# Patient Record
Sex: MALE | Race: WHITE | NOT HISPANIC OR LATINO | Employment: FULL TIME | URBAN - METROPOLITAN AREA
[De-identification: names, ages, dates, MRNs, and addresses within clinical notes are randomized per-mention and may not be internally consistent; named-entity substitution may affect disease eponyms.]

---

## 2021-06-03 ENCOUNTER — TELEPHONE (OUTPATIENT)
Dept: SCHEDULING | Facility: IMAGING CENTER | Age: 31
End: 2021-06-03

## 2021-06-08 ENCOUNTER — OFFICE VISIT (OUTPATIENT)
Dept: MEDICAL GROUP | Facility: PHYSICIAN GROUP | Age: 31
End: 2021-06-08
Payer: COMMERCIAL

## 2021-06-08 VITALS
WEIGHT: 297 LBS | OXYGEN SATURATION: 98 % | HEIGHT: 68 IN | TEMPERATURE: 98.2 F | SYSTOLIC BLOOD PRESSURE: 140 MMHG | BODY MASS INDEX: 45.01 KG/M2 | RESPIRATION RATE: 16 BRPM | HEART RATE: 96 BPM | DIASTOLIC BLOOD PRESSURE: 82 MMHG

## 2021-06-08 DIAGNOSIS — Z13.6 SCREENING FOR CARDIOVASCULAR CONDITION: ICD-10-CM

## 2021-06-08 DIAGNOSIS — F41.1 GAD (GENERALIZED ANXIETY DISORDER): ICD-10-CM

## 2021-06-08 DIAGNOSIS — G56.03 BILATERAL CARPAL TUNNEL SYNDROME: ICD-10-CM

## 2021-06-08 DIAGNOSIS — Z13.1 ENCOUNTER FOR SCREENING FOR DIABETES MELLITUS: ICD-10-CM

## 2021-06-08 DIAGNOSIS — Z13.0 SCREENING FOR DEFICIENCY ANEMIA: ICD-10-CM

## 2021-06-08 DIAGNOSIS — G47.33 OSA (OBSTRUCTIVE SLEEP APNEA): ICD-10-CM

## 2021-06-08 DIAGNOSIS — M25.50 ARTHRALGIA, UNSPECIFIED JOINT: ICD-10-CM

## 2021-06-08 DIAGNOSIS — F33.1 MODERATE EPISODE OF RECURRENT MAJOR DEPRESSIVE DISORDER (HCC): ICD-10-CM

## 2021-06-08 DIAGNOSIS — M72.2 PLANTAR FASCIITIS, BILATERAL: ICD-10-CM

## 2021-06-08 PROBLEM — F32.9 MDD (MAJOR DEPRESSIVE DISORDER): Status: ACTIVE | Noted: 2021-06-08

## 2021-06-08 PROCEDURE — 99204 OFFICE O/P NEW MOD 45 MIN: CPT | Performed by: INTERNAL MEDICINE

## 2021-06-08 RX ORDER — BUSPIRONE HYDROCHLORIDE 10 MG/1
10 TABLET ORAL 2 TIMES DAILY
Qty: 60 TABLET | Refills: 3 | Status: SHIPPED | OUTPATIENT
Start: 2021-06-08

## 2021-06-08 ASSESSMENT — PATIENT HEALTH QUESTIONNAIRE - PHQ9
CLINICAL INTERPRETATION OF PHQ2 SCORE: 2
5. POOR APPETITE OR OVEREATING: 3 - NEARLY EVERY DAY
SUM OF ALL RESPONSES TO PHQ QUESTIONS 1-9: 16

## 2021-06-08 NOTE — PROGRESS NOTES
Subjective:     CC: Establish care, annual well visit    HPI:   Rubén Tolbert is a 31 y.o. male who presents for an annual exam.     Health Maintenance  Cholesterol Screening:  Diabetes Screening:   Diet:   Exercise:   Substance Abuse:   Sunscreen used.    Infectious disease screening/Immunizations  --HIV Screening:   --Hepatitis C Screening:   --Immunizations:    Tetanus:      He  has no past medical history on file.  He  has no past surgical history on file.  No family history on file.  Social History     Tobacco Use   • Smoking status: Not on file   Substance Use Topics   • Alcohol use: Not on file   • Drug use: Not on file       There are no problems to display for this patient.      No current outpatient medications on file.     No current facility-administered medications for this visit.    (including changes today)  Allergies: Patient has no allergy information on record.    Review of Systems   Constitutional: Negative for fever, chills   Respiratory: Negative for cough and shortness of breath.    Cardiovascular: Negative for chest pain or palpitations  Gastrointestinal: Negative for nausea, vomiting, abdominal pain and diarrhea.   Psychiatric/Behavioral: Negative for depression.  The patient is not nervous/anxious.    Objective:     There were no vitals taken for this visit.  There is no height or weight on file to calculate BMI.  Wt Readings from Last 4 Encounters:   No data found for Wt       Physical Exam:  Constitutional: Well-developed and well-nourished.  Eyes: Pupils are equal, round, and reactive to light. No scleral icterus.   Mouth/Throat: Oropharynx is clear and moist. Posterior pharynx without erythema or exudates.  Neck: No thyromegaly present. No lymphadenopathy--cervical or supraclavicular.  Cardiovascular: Regular rate and rhythm, S1 and S2 without murmur, rubs, or gallops.  Lungs: Normal inspiratory effort, CTA bilaterally, no wheezes/rhonchi/rales  Abdomen: Soft, non tender, and without  distention.  Extremities: No cyanosis, clubbing, erythema, nor edema.   Psychiatric:  Behavior, mood, and affect are appropriate.    Assessment and Plan:   Rubén Tolbert is a 31 y.o. male who presents for an annual exam.       HCM: Completed  Labs per orders  Immunizations per orders  Patient counseled about diet, exercise, safe sex, and sun protection.    Follow-up: No follow-ups on file.     My total time spent caring for the patient on the day of the encounter was *** minutes.   This does not include time spent on separately billable procedures/tests.      Please note that this dictation was created using voice recognition software. I have made every reasonable attempt to correct obvious errors, but I expect that there are errors of grammar and possibly content that I did not discover before finalizing the note.

## 2021-06-08 NOTE — PROGRESS NOTES
Subjective:     CC: Establish care    HISTORY OF THE PRESENT ILLNESS: Patient is a 31 y.o. male. This pleasant patient is here today to establish care and discuss the following issues:    The patient states he moved here in December, after living in [place].  He is currently living with his brother.  His dad also lives in the [facility].  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  He states he has a longstanding history of major depressive disorder, generalized anxiety disorder, and panic attacks.  His most recent medications have included Trintellix, buspirone, and Ativan.  He has not been taking these medications since he moved to the [facility] in January.  He reports a good social support system.  He does have fleeting thoughts of self harm, but no suicidal ideation.  He feels he needs to get back on his medications.  He also reports bilateral heel pain.  The pain is most prominent when he takes a first step out of bed in the morning and when he takes his shoes off at night.  He also reports chronic lower back pain and bilateral leg pain below the knee.  He states his father was diagnosed with RA, but he has tested negative for it in the past.  He also has a history of bilateral carpal tunnel syndrome.  He states that it is progressed to the point that he cannot hold the phone without getting tingling in his hands.  He has taken oral prednisone in the past as well as worn wrist splints.    Allergies: Paxil [fd&c blue #2 [place]-paroxetine]    Current Outpatient Medications Ordered in Epic   Medication Sig Dispense Refill   • busPIRone (BUSPAR) 10 MG Tab tablet Take 1 tablet by mouth 2 times a day. 60 tablet 3     No current Epic-ordered facility-administered medications on file.       No past medical history on file.    No past surgical history on file.    Social History     Tobacco Use   • Smoking status: Never Smoker   • Smokeless tobacco: Never Used   Substance Use Topics   • Alcohol use: Not Currently   •  "Drug use: Never       Social History     Social History Narrative   • Not on file       No family history on file.    Health Maintenance: Completed    ROS:   Gen: no fevers/chills  Pulm: no sob, no cough  CV: no chest pain, no palpitations  GI: no nausea/vomiting, no diarrhea  Neuro: no headaches, no numbness/tingling      Objective:     Exam: /82   Pulse 96   Temp 36.8 °C (98.2 °F)   Resp 16   Ht 1.727 m (5' 8\")   Wt (!) 135 kg (297 lb)   SpO2 98%  Body mass index is 45.16 kg/m².    General: Normal appearing. No distress.  HEENT: Normocephalic. Eyes conjunctiva clear lids without ptosis, pupils equal and reactive to light accommodation, oropharynx is without erythema, edema or exudates.   Pulmonary: Clear to ausculation.  Normal effort. No rales, ronchi, or wheezing.  Cardiovascular: Regular rate and rhythm without murmur.   Abdomen: Soft, nontender, nondistended.   Musculoskeletal: No extremity cyanosis, clubbing, or edema.  Psych: Normal mood and affect. Alert and oriented x3. Judgment and insight is normal.    Labs: Reviewed and discussed with patient.    Assessment & Plan:   31 y.o. male with the following -    Moderate episode of recurrent major depressive disorder (HCC)  CASANDRA (generalized anxiety disorder)  This is a chronic condition.  Progressive.  The patient states that he has previously been on Trintellix, buspirone, and Ativan for panic attacks.  He states that he has not been taking this medication since he moved to [place] in January.  He does feel that he needs to leave that place medications.  He reports fleeting thoughts of self-harm, but no actual plan.  -We will start BuSpar 10 mg twice daily  -Referral to behavioral health placed for patient's medication management  - REFERRAL TO BEHAVIORAL HEALTH  - busPIRone (BUSPAR) 10 MG Tab tablet; Take 1 tablet by mouth 2 times a day.  Dispense: 60 tablet; Refill: 3    DAVON (obstructive sleep apnea)  This is a chronic condition.  The patient states " he has previously been on CPAP when he was living in [place].  Some of his supplies are now not working correctly.  Patient to contact his insurance company to see which supplier will need to go with him and we will order the appropriate supplies.  If it is needed, we have placed a referral for repeat sleep study.  - REFERRAL TO PULMONARY AND SLEEP MEDICINE    Bilateral carpal tunnel syndrome  This is an acute condition.  Progressive.  Patient with bilateral wrist and hand tingling.  He states that it has progressed to the point that he now has hand tingling even when holding the phone.  He has tried wrist splinting in the past.  He has also taken oral prednisone previously.  -The patient was given an informational handout on carpal tunnel syndrome as well as stretching exercises  -Recommended NSAIDs, ice application, wrist splinting, and rest as possible though this is difficult given his work and she will  -If no improvement, the patient may be a candidate for corticosteroid injection and/or surgical intervention    Plantar fasciitis, bilateral  This is an acute condition.  Progressive.  Patient with bilateral heel pain.  This is most consistent with plantar fasciitis.  -The patient was given an informational handout on plantar fasciitis as well as stretching exercises  -Recommended orthotic shoe inserts, ice application, and rest as possible though this is difficult given his work at [employer]  -If no improvement with conservative measures we will consider alternative diagnoses    Arthralgia, unspecified joint  This is a chronic condition.  Stable.  The patient states his father has RA.  He states that he has tested negative for rheumatoid factor in the past.  - CCP  - RHEUMATOID ARTHRITIS FACTOR; Future    Screening for deficiency anemia  - CBC WITHOUT DIFFERENTIAL; Future    Screening for cardiovascular condition  - Lipid Profile; Future    Encounter for screening for diabetes mellitus  - Comp Metabolic Panel;  Future    Return in about 4 weeks (around 7/6/2021) for multiple medical issues.    Please note that this dictation was created using voice recognition software. I have made every reasonable attempt to correct obvious errors, but I expect that there are errors of grammar and possibly content that I did not discover before finalizing the note.

## 2021-06-08 NOTE — PATIENT INSTRUCTIONS
Wrist splints, NSAIDs, Ice for Carpal Tunnel Syndrome  Stretching, Ice, Shoe Inserts for Plantar Fasciitis      Carpal Tunnel Syndrome    Carpal tunnel syndrome is a condition that causes pain in your hand and arm. The carpal tunnel is a narrow area located on the palm side of your wrist. Repeated wrist motion or certain diseases may cause swelling within the tunnel. This swelling pinches the main nerve in the wrist (median nerve).  What are the causes?  This condition may be caused by:  · Repeated wrist motions.  · Wrist injuries.  · Arthritis.  · A cyst or tumor in the carpal tunnel.  · Fluid buildup during pregnancy.  Sometimes the cause of this condition is not known.  What increases the risk?  The following factors may make you more likely to develop this condition:  · Having a job, such as being a  or a , that requires you to repeatedly move your wrist in the same motion.  · Being a woman.  · Having certain conditions, such as:  ? Diabetes.  ? Obesity.  ? An underactive thyroid (hypothyroidism).  ? Kidney failure.  What are the signs or symptoms?  Symptoms of this condition include:  · A tingling feeling in your fingers, especially in your thumb, index, and middle fingers.  · Tingling or numbness in your hand.  · An aching feeling in your entire arm, especially when your wrist and elbow are bent for a long time.  · Wrist pain that goes up your arm to your shoulder.  · Pain that goes down into your palm or fingers.  · A weak feeling in your hands. You may have trouble grabbing and holding items.  Your symptoms may feel worse during the night.  How is this diagnosed?  This condition is diagnosed with a medical history and physical exam. You may also have tests, including:  · Electromyogram (EMG). This test measures electrical signals sent by your nerves into the muscles.  · Nerve conduction study. This test measures how well electrical signals pass through your nerves.  · Imaging tests, such as  X-rays, ultrasound, and MRI. These tests check for possible causes of your condition.  How is this treated?  This condition may be treated with:  · Lifestyle changes. It is important to stop or change the activity that caused your condition.  · Doing exercise and activities to strengthen your muscles and bones (physical therapy).  · Learning how to use your hand again after diagnosis (occupational therapy).  · Medicines for pain and inflammation. This may include medicine that is injected into your wrist.  · A wrist splint.  · Surgery.  Follow these instructions at home:  If you have a splint:  · Wear the splint as told by your health care provider. Remove it only as told by your health care provider.  · Loosen the splint if your fingers tingle, become numb, or turn cold and blue.  · Keep the splint clean.  · If the splint is not waterproof:  ? Do not let it get wet.  ? Cover it with a watertight covering when you take a bath or shower.  Managing pain, stiffness, and swelling    · If directed, put ice on the painful area:  ? If you have a removable splint, remove it as told by your health care provider.  ? Put ice in a plastic bag.  ? Place a towel between your skin and the bag.  ? Leave the ice on for 20 minutes, 2-3 times per day.  General instructions  · Take over-the-counter and prescription medicines only as told by your health care provider.  · Rest your wrist from any activity that may be causing your pain. If your condition is work related, talk with your employer about changes that can be made, such as getting a wrist pad to use while typing.  · Do any exercises as told by your health care provider, physical therapist, or occupational therapist.  · Keep all follow-up visits as told by your health care provider. This is important.  Contact a health care provider if:  · You have new symptoms.  · Your pain is not controlled with medicines.  · Your symptoms get worse.  Get help right away if:  · You have severe  numbness or tingling in your wrist or hand.  Summary  · Carpal tunnel syndrome is a condition that causes pain in your hand and arm.  · It is usually caused by repeated wrist motions.  · Lifestyle changes and medicines are used to treat carpal tunnel syndrome. Surgery may be recommended.  · Follow your health care provider's instructions about wearing a splint, resting from activity, keeping follow-up visits, and calling for help.  This information is not intended to replace advice given to you by your health care provider. Make sure you discuss any questions you have with your health care provider.  Document Released: 12/15/2001 Document Revised: 04/26/2019 Document Reviewed: 04/26/2019  [employer] Patient Education © 2020 [employer]      Wrist and Forearm Exercises  Ask your health care provider which exercises are safe for you. Do exercises exactly as told by your health care provider and adjust them as directed. It is normal to feel mild stretching, pulling, tightness, or discomfort as you do these exercises. Stop right away if you feel sudden pain or your pain gets worse. Do not begin these exercises until told by your health care provider.  Range-of-motion exercises  These exercises warm up your muscles and joints and improve the movement and flexibility of your injured wrist and forearm. These exercises also help to relieve pain, numbness, and tingling. These exercises are done using the muscles in your injured wrist and forearm.  Wrist flexion  1. Bend your left / right elbow to a 90-degree angle (right angle) with your palm facing the floor.  2. Bend your wrist so that your fingers point toward the floor (flexion).  3. Hold this position for __________ seconds.  4. Slowly return to the starting position.  Repeat __________ times. Complete this exercise __________ times a day.  Wrist extension  1. Bend your left / right elbow to a 90-degree angle (right angle) with your palm facing the floor.  2. Bend your  wrist so that your fingers point toward the ceiling (extension).  3. Hold this position for __________ seconds.  4. Slowly return to the starting position.  Repeat __________ times. Complete this exercise __________ times a day.  Ulnar deviation  1. Bend your left / right elbow to a 90-degree angle (right angle), and rest your forearm on a table with your palm facing down.  2. Keeping your hand flat on the table, bend your left /right wrist toward your small finger (pinkie). This is ulnar deviation.  3. Hold this position for __________ seconds.  4. Slowly return to the starting position.  Repeat __________ times. Complete this exercise __________ times a day.  Radial deviation  1. Bend your left / right elbow to a 90-degree angle (right angle), and rest your forearm on a table with your palm facing down.  2. Keeping your hand flat on the table, bend your left /right wrist toward your thumb. This is radial deviation.  3. Hold this position for __________ seconds.  4. Slowly return to the starting position.  Repeat __________ times. Complete this exercise __________ times a day.  Forearm rotation, supination    1. Sit with your left / right elbow bent to a 90-degree angle (right angle). Position your forearm so that the thumb is facing the ceiling (neutral position).  2. Turn (rotate) your palm up toward the ceiling (supination), stopping when you feel a gentle stretch.  3. Hold this position for __________ seconds.  4. Slowly return to the starting position.  Repeat __________ times. Complete this exercise __________ times a day.  Forearm rotation, pronation    1. Sit with your left / right elbow bent to a 90-degree angle (right angle). Position your forearm so that the thumb is facing the ceiling (neutral position).  2. Rotate your palm down toward the floor (pronation), stopping when you feel a gentle stretch.  3. Hold this position for __________ seconds.  4. Slowly return to the starting position.  Repeat  __________ times. Complete this exercise __________ times a day.  Stretching  These exercises warm up your muscles and joints and improve the movement and flexibility of your injured wrist and forearm. These exercises also help to relieve pain, numbness, and tingling. These exercises are done using your healthy wrist and forearm to help stretch the muscles in your injured wrist and forearm.  Wrist flexion    1. Extend your left / right arm in front of you, and turn your palm down toward the floor.  ? If told by your health care provider, bend your left / right elbow to a 90-degree angle (right angle) at your side.  2. Using your uninjured hand, gently press over the back of your left / right hand to bend your wrist and fingers toward the floor (flexion). Go as far as you can to feel a stretch without causing pain.  3. Hold this position for __________ seconds.  4. Slowly return to the starting position.  Repeat __________ times. Complete this exercise __________ times a day.  Wrist extension    1. Extend your left / right arm in front of you and turn your palm up toward the ceiling.  ? If told by your health care provider, bend your left / right elbow to a 90-degree angle (right angle) at your side.  2. Using your uninjured hand, gently press over the palm of your left / right hand to bend your wrist and fingers toward the floor (extension). Go as far as you can to feel a stretch without causing pain.  3. Hold this position for __________ seconds.  4. Slowly return to the starting position.  Repeat __________ times. Complete this exercise __________ times a day.  Forearm rotation, supination  1. Sit with your left / right elbow bent to a 90-degree angle (right angle). Position your forearm so that the thumb is facing the ceiling (neutral position).  2. Rotate your palm up toward the ceiling as far as you can on your own (supination). Then, use your uninjured hand to help turn your forearm more, stopping when you  feel a gentle stretch.  3. Hold this position for __________ seconds.  4. Slowly return to the starting position.  Repeat __________ times. Complete this exercise __________ times a day.  Forearm rotation, pronation  1. Sit with your left / right elbow bent to a 90-degree angle (right angle). Position your forearm so that the thumb is facing the ceiling (neutral position).  2. Rotate your palm down toward the floor as far as you can on your own (pronation). Then, use your uninjured hand to help turn your forearm more, stopping when you feel a gentle stretch.  3. Hold this position for __________ seconds.  4. Slowly return to the starting position.  Repeat __________ times. Complete this exercise __________ times a day.  Strengthening exercises  These exercises build strength and endurance in your wrist and forearm. Endurance is the ability to use your muscles for a long time, even after they get tired.  Wrist flexion    1. Sit with your left / right forearm supported on a table or other surface. Bend your elbow to a 90-degree angle (right angle), and rest your hand palm-up over the edge of the table.  2. Hold a __________ weight in your left / right hand. Or, hold an exercise band or tube in both hands, keeping your hands at the same level and hip distance apart. There should be a slight tension in the exercise band or tube.  3. Slowly curl your hand up toward the ceiling (flexion).  4. Hold this position for __________ seconds.  5. Slowly lower your hand back to the starting position.  Repeat __________ times. Complete this exercise __________ times a day.  Wrist extension    1. Sit with your left / right forearm supported on a table or other surface. Bend your elbow to a 90-degree angle (right angle), and rest your hand palm-down over the edge of the table.  2. Hold a __________ weight in your left / right hand. Or, hold an exercise band or tube in both hands, keeping your hands at the same level and hip distance  apart. There should be a slight tension in the exercise band or tube.  3. Slowly curl your hand up toward the ceiling (extension).  4. Hold this position for __________ seconds.  5. Slowly lower your hand back to the starting position.  Repeat __________ times. Complete this exercise __________ times a day.  Forearm rotation, supination    1. Sit with your left / right forearm supported on a table or other surface. Bend your elbow to a 90-degree angle (right angle). Position your forearm so that your thumb is facing the ceiling (neutral position) and your hand is resting over the edge of the table.  2. Hold a hammer in your left / right hand.  ? This exercise will be easier if you hold the hammer near the head of the hammer.  ? This exercise will be harder if you hold the hammer near the end of the handle.  3. Without moving your elbow, slowly rotate your palm up toward the ceiling (supination).  4. Hold this position for __________ seconds.  5. Slowly return to the starting position.  Repeat __________ times. Complete this exercise __________ times a day.  Forearm rotation, pronation    1. Sit with your left / right forearm supported on a table or other surface. Bend your elbow to a 90-degree angle (right angle). Position your forearm so that the thumb is facing the ceiling (neutral position), with your hand resting over the edge of the table.  2. Hold a hammer in your left / right hand.  ? This exercise will be easier if you hold the hammer near the head of the hammer.  ? This exercise will be harder if you hold the hammer near the end of the handle.  3. Without moving your elbow, slowly rotate your palm down toward the floor (pronation).  4. Hold this position for __________ seconds.  5. Slowly return to the starting position.  Repeat __________ times. Complete this exercise __________ times a day.   strengthening    1. Grasp a stress ball or other ball in the middle of your left / right hand. Start with your  elbow bent to a 90-degree angle (right angle).  2. Slowly increase the pressure, squeezing the ball as hard as you can without causing pain.  ? Think of bringing the tips of your fingers into the middle of your palm. All of your finger joints should bend when doing this exercise.  ? To make this exercise harder, gradually try to straighten your elbow in front of you, until you can do the exercise with your elbow fully straight.  3. Hold your squeeze for __________ seconds, then relax. If instructed by your health care provider, do this exercise:  ? With your forearm positioned so that the thumb is facing the ceiling (neutral position).  ? With your forearm turned palm down.  ? With your forearm turned palm up.  Repeat __________ times. Complete this exercise __________ times a day.  This information is not intended to replace advice given to you by your health care provider. Make sure you discuss any questions you have with your health care provider.  Document Released: 11/01/2006 Document Revised: 02/06/2020 Document Reviewed: 02/06/2020  [employer] Patient Education © 2020 [employer]        Plantar Fasciitis    Plantar fasciitis is a painful foot condition that affects the heel. It occurs when the band of tissue that connects the toes to the heel bone (plantar fascia) becomes irritated. This can happen as the result of exercising too much or doing other repetitive activities (overuse injury).  The pain from plantar fasciitis can range from mild irritation to severe pain that makes it difficult to walk or move. The pain is usually worse in the morning after sleeping, or after sitting or lying down for a while. Pain may also be worse after long periods of walking or standing.  What are the causes?  This condition may be caused by:  · Standing for long periods of time.  · Wearing shoes that do not have good arch support.  · Doing activities that put stress on joints (high-impact activities), including running,  aerobics, and ballet.  · Being overweight.  · An abnormal way of walking (gait).  · Tight muscles in the back of your lower leg (calf).  · High arches in your feet.  · Starting a new athletic activity.  What are the signs or symptoms?  The main symptom of this condition is heel pain. Pain may:  · Be worse with first steps after a time of rest, especially in the morning after sleeping or after you have been sitting or lying down for a while.  · Be worse after long periods of standing still.  · Decrease after 30-45 minutes of activity, such as gentle walking.  How is this diagnosed?  This condition may be diagnosed based on your medical history and your symptoms. Your health care provider may ask questions about your activity level. Your health care provider will do a physical exam to check for:  · A tender area on the bottom of your foot.  · A high arch in your foot.  · Pain when you move your foot.  · Difficulty moving your foot.  You may have imaging tests to confirm the diagnosis, such as:  · X-rays.  · Ultrasound.  · MRI.  How is this treated?  Treatment for plantar fasciitis depends on how severe your condition is. Treatment may include:  · Rest, ice, applying pressure (compression), and raising the affected foot (elevation). This may be called RICE therapy. Your health care provider may recommend RICE therapy along with over-the-counter pain medicines to manage your pain.  · Exercises to stretch your calves and your plantar fascia.  · A splint that holds your foot in a stretched, upward position while you sleep (night splint).  · Physical therapy to relieve symptoms and prevent problems in the future.  · Injections of steroid medicine (cortisone) to relieve pain and inflammation.  · Stimulating your plantar fascia with electrical impulses (extracorporeal shock wave therapy). This is usually the last treatment option before surgery.  · Surgery, if other treatments have not worked after 12 months.  Follow these  instructions at home:    Managing pain, stiffness, and swelling  · If directed, put ice on the painful area:  ? Put ice in a plastic bag, or use a frozen bottle of water.  ? Place a towel between your skin and the bag or bottle.  ? Roll the bottom of your foot over the bag or bottle.  ? Do this for 20 minutes, 2-3 times a day.  · Wear athletic shoes that have air-sole or gel-sole cushions, or try wearing soft shoe inserts that are designed for plantar fasciitis.  · Raise (elevate) your foot above the level of your heart while you are sitting or lying down.  Activity  · Avoid activities that cause pain. Ask your health care provider what activities are safe for you.  · Do physical therapy exercises and stretches as told by your health care provider.  · Try activities and forms of exercise that are easier on your joints (low-impact). Examples include swimming, water aerobics, and biking.  General instructions  · Take over-the-counter and prescription medicines only as told by your health care provider.  · Wear a night splint while sleeping, if told by your health care provider. Loosen the splint if your toes tingle, become numb, or turn cold and blue.  · Maintain a healthy weight, or work with your health care provider to lose weight as needed.  · Keep all follow-up visits as told by your health care provider. This is important.  Contact a health care provider if you:  · Have symptoms that do not go away after caring for yourself at home.  · Have pain that gets worse.  · Have pain that affects your ability to move or do your daily activities.  Summary  · Plantar fasciitis is a painful foot condition that affects the heel. It occurs when the band of tissue that connects the toes to the heel bone (plantar fascia) becomes irritated.  · The main symptom of this condition is heel pain that may be worse after exercising too much or standing still for a long time.  · Treatment varies, but it usually starts with rest, ice,  compression, and elevation (RICE therapy) and over-the-counter medicines to manage pain.  This information is not intended to replace advice given to you by your health care provider. Make sure you discuss any questions you have with your health care provider.  Document Released: 09/12/2002 Document Revised: 11/30/2018 Document Reviewed: 10/15/2018  [employer] Patient Education © 2020 [employer]      Plantar Fasciitis Rehab  Ask your health care provider which exercises are safe for you. Do exercises exactly as told by your health care provider and adjust them as directed. It is normal to feel mild stretching, pulling, tightness, or discomfort as you do these exercises. Stop right away if you feel sudden pain or your pain gets worse. Do not begin these exercises until told by your health care provider.  Stretching and range-of-motion exercises  These exercises warm up your muscles and joints and improve the movement and flexibility of your foot. These exercises also help to relieve pain.  Plantar fascia stretch    1. Sit with your left / right leg crossed over your opposite knee.  2. Hold your heel with one hand with that thumb near your arch. With your other hand, hold your toes and gently pull them back toward the top of your foot. You should feel a stretch on the bottom of your toes or your foot (plantar fascia) or both.  3. Hold this stretch for__________ seconds.  4. Slowly release your toes and return to the starting position.  Repeat __________ times. Complete this exercise __________ times a day.  Gastrocnemius stretch, standing  This exercise is also called a calf (gastroc) stretch. It stretches the muscles in the back of the upper calf.  1. Stand with your hands against a wall.  2. Extend your left / right leg behind you, and bend your front knee slightly.  3. Keeping your heels on the floor and your back knee straight, shift your weight toward the wall. Do not arch your back. You should feel a gentle  stretch in your upper left / right calf.  4. Hold this position for __________ seconds.  Repeat __________ times. Complete this exercise __________ times a day.  Soleus stretch, standing  This exercise is also called a calf (soleus) stretch. It stretches the muscles in the back of the lower calf.  1. Stand with your hands against a wall.  2. Extend your left / right leg behind you, and bend your front knee slightly.  3. Keeping your heels on the floor, bend your back knee and shift your weight slightly over your back leg. You should feel a gentle stretch deep in your lower calf.  4. Hold this position for __________ seconds.  Repeat __________ times. Complete this exercise __________ times a day.  Gastroc and soleus stretch, standing step  This exercise stretches the muscles in the back of the lower leg. These muscles are in the upper calf (gastrocnemius) and the lower calf (soleus).  1. Stand with the ball of your left / right foot on a step. The ball of your foot is on the walking surface, right under your toes.  2. Keep your other foot firmly on the same step.  3. Hold on to the wall or a railing for balance.  4. Slowly lift your other foot, allowing your body weight to press your left / right heel down over the edge of the step. You should feel a stretch in your left / right calf.  5. Hold this position for __________ seconds.  6. Return both feet to the step.  7. Repeat this exercise with a slight bend in your left / right knee.  Repeat __________ times with your left / right knee straight and __________ times with your left / right knee bent. Complete this exercise __________ times a day.  Balance exercise  This exercise builds your balance and strength control of your arch to help take pressure off your plantar fascia.  Single leg stand  If this exercise is too easy, you can try it with your eyes closed or while standing on a pillow.  1. Without shoes, stand near a railing or in a doorway. You may hold on to  the railing or door frame as needed.  2. Stand on your left / right foot. Keep your big toe down on the floor and try to keep your arch lifted. Do not let your foot roll inward.  3. Hold this position for __________ seconds.  Repeat __________ times. Complete this exercise __________ times a day.  This information is not intended to replace advice given to you by your health care provider. Make sure you discuss any questions you have with your health care provider.  Document Released: 12/18/2006 Document Revised: 04/09/2020 Document Reviewed: 10/16/2019  [employer] Patient Education © 2020 [employer]

## 2021-06-09 ENCOUNTER — HOSPITAL ENCOUNTER (OUTPATIENT)
Dept: LAB | Facility: MEDICAL CENTER | Age: 31
End: 2021-06-09
Attending: INTERNAL MEDICINE
Payer: COMMERCIAL

## 2021-06-09 DIAGNOSIS — Z13.6 SCREENING FOR CARDIOVASCULAR CONDITION: ICD-10-CM

## 2021-06-09 DIAGNOSIS — Z13.1 ENCOUNTER FOR SCREENING FOR DIABETES MELLITUS: ICD-10-CM

## 2021-06-09 DIAGNOSIS — Z13.0 SCREENING FOR DEFICIENCY ANEMIA: ICD-10-CM

## 2021-06-09 DIAGNOSIS — M25.50 ARTHRALGIA, UNSPECIFIED JOINT: ICD-10-CM

## 2021-06-09 LAB
ALBUMIN SERPL BCP-MCNC: 4.4 G/DL (ref 3.2–4.9)
ALBUMIN/GLOB SERPL: 1.3 G/DL
ALP SERPL-CCNC: 101 U/L (ref 30–99)
ALT SERPL-CCNC: 39 U/L (ref 2–50)
ANION GAP SERPL CALC-SCNC: 9 MMOL/L (ref 7–16)
AST SERPL-CCNC: 29 U/L (ref 12–45)
BILIRUB SERPL-MCNC: 0.5 MG/DL (ref 0.1–1.5)
BUN SERPL-MCNC: 14 MG/DL (ref 8–22)
CALCIUM SERPL-MCNC: 9.3 MG/DL (ref 8.5–10.5)
CHLORIDE SERPL-SCNC: 100 MMOL/L (ref 96–112)
CHOLEST SERPL-MCNC: 129 MG/DL (ref 100–199)
CO2 SERPL-SCNC: 26 MMOL/L (ref 20–33)
CREAT SERPL-MCNC: 0.8 MG/DL (ref 0.5–1.4)
ERYTHROCYTE [DISTWIDTH] IN BLOOD BY AUTOMATED COUNT: 49.1 FL (ref 35.9–50)
FASTING STATUS PATIENT QL REPORTED: NORMAL
GLOBULIN SER CALC-MCNC: 3.4 G/DL (ref 1.9–3.5)
GLUCOSE SERPL-MCNC: 99 MG/DL (ref 65–99)
HCT VFR BLD AUTO: 49.6 % (ref 42–52)
HDLC SERPL-MCNC: 33 MG/DL
HGB BLD-MCNC: 15.6 G/DL (ref 14–18)
LDLC SERPL CALC-MCNC: 68 MG/DL
MCH RBC QN AUTO: 27.8 PG (ref 27–33)
MCHC RBC AUTO-ENTMCNC: 31.5 G/DL (ref 33.7–35.3)
MCV RBC AUTO: 88.4 FL (ref 81.4–97.8)
PLATELET # BLD AUTO: 348 K/UL (ref 164–446)
PMV BLD AUTO: 11.3 FL (ref 9–12.9)
POTASSIUM SERPL-SCNC: 4.1 MMOL/L (ref 3.6–5.5)
PROT SERPL-MCNC: 7.8 G/DL (ref 6–8.2)
RBC # BLD AUTO: 5.61 M/UL (ref 4.7–6.1)
RHEUMATOID FACT SER IA-ACNC: <10 IU/ML (ref 0–14)
SODIUM SERPL-SCNC: 135 MMOL/L (ref 135–145)
TRIGL SERPL-MCNC: 142 MG/DL (ref 0–149)
WBC # BLD AUTO: 10.4 K/UL (ref 4.8–10.8)

## 2021-06-09 PROCEDURE — 80061 LIPID PANEL: CPT

## 2021-06-09 PROCEDURE — 36415 COLL VENOUS BLD VENIPUNCTURE: CPT

## 2021-06-09 PROCEDURE — 85027 COMPLETE CBC AUTOMATED: CPT

## 2021-06-09 PROCEDURE — 86431 RHEUMATOID FACTOR QUANT: CPT

## 2021-06-09 PROCEDURE — 80053 COMPREHEN METABOLIC PANEL: CPT

## 2021-06-09 PROCEDURE — 86200 CCP ANTIBODY: CPT

## 2021-06-12 LAB — CCP IGG SERPL-ACNC: 12 UNITS (ref 0–19)

## 2021-07-16 ENCOUNTER — OFFICE VISIT (OUTPATIENT)
Dept: MEDICAL GROUP | Facility: PHYSICIAN GROUP | Age: 31
End: 2021-07-16
Payer: COMMERCIAL

## 2021-07-16 VITALS
HEART RATE: 91 BPM | TEMPERATURE: 98 F | DIASTOLIC BLOOD PRESSURE: 74 MMHG | BODY MASS INDEX: 44.41 KG/M2 | OXYGEN SATURATION: 98 % | RESPIRATION RATE: 14 BRPM | WEIGHT: 293 LBS | SYSTOLIC BLOOD PRESSURE: 130 MMHG | HEIGHT: 68 IN

## 2021-07-16 DIAGNOSIS — M72.2 PLANTAR FASCIITIS, BILATERAL: ICD-10-CM

## 2021-07-16 DIAGNOSIS — F41.1 GAD (GENERALIZED ANXIETY DISORDER): ICD-10-CM

## 2021-07-16 DIAGNOSIS — G47.33 OSA (OBSTRUCTIVE SLEEP APNEA): ICD-10-CM

## 2021-07-16 DIAGNOSIS — F33.1 MODERATE EPISODE OF RECURRENT MAJOR DEPRESSIVE DISORDER (HCC): ICD-10-CM

## 2021-07-16 DIAGNOSIS — M25.50 PAIN IN JOINTS: ICD-10-CM

## 2021-07-16 DIAGNOSIS — G56.03 BILATERAL CARPAL TUNNEL SYNDROME: ICD-10-CM

## 2021-07-16 PROCEDURE — 99213 OFFICE O/P EST LOW 20 MIN: CPT | Performed by: INTERNAL MEDICINE

## 2021-07-16 ASSESSMENT — FIBROSIS 4 INDEX: FIB4 SCORE: 0.41

## 2021-07-16 NOTE — PROGRESS NOTES
"Subjective:     CC: Follow-up on labs    HPI:   Rubén presents today to discuss the following issues:    At the patient's last appointment he was started on buspirone for depression and anxiety.  He states this medication is working well for him.  He does not feel that any medication adjustments are needed at this time.  He continues to report numerous arthralgias and tendinopathy pains.  He states that the pains migrate around his body.  At his last visit he felt the pain was localized in his heels, now he is describing more dorsal proximal foot pain.  He states that the stretching exercises given to him at his last visit are only minimally effective.  RA and CCP labs were negative.      No past medical history on file.    Social History     Tobacco Use   • Smoking status: Never Smoker   • Smokeless tobacco: Never Used   Substance Use Topics   • Alcohol use: Not Currently   • Drug use: Never       Current Outpatient Medications Ordered in Epic   Medication Sig Dispense Refill   • busPIRone (BUSPAR) 10 MG Tab tablet Take 1 tablet by mouth 2 times a day. 60 tablet 3     No current Epic-ordered facility-administered medications on file.       Allergies:  Paxil [fd&c blue #2 [place]-paroxetine]    Health Maintenance: Completed    ROS:   Denies any recent fevers or chills. No nausea or vomiting. No chest pains or shortness of breath.      Objective:       Exam:  /74   Pulse 91   Temp 36.7 °C (98 °F)   Resp 14   Ht 1.727 m (5' 8\")   Wt (!) 133 kg (293 lb)   SpO2 98%   BMI 44.55 kg/m²  Body mass index is 44.55 kg/m².    Gen: Alert and oriented, No apparent distress.      Assessment & Plan:     31 y.o. male with the following -     Moderate episode of recurrent major depressive disorder (HCC)  CASANDRA (generalized anxiety disorder)  This is a chronic condition.  Progressive.  The patient states that he has previously been on Trintellix, buspirone, and Ativan for panic attacks.  He states that he has not been " taking this medication since he moved to [place] in January.    At his last visit the patient was started on buspirone 10 mg twice daily.  This is working well for him.  He feels this medication is sufficient as his living situation has improved.  -Continue buspirone 10 mg twice daily     DAVON (obstructive sleep apnea)  This is a chronic condition.    Stable.  The patient states he has previously been on CPAP when he was living in [place].  Some of his supplies are now not working correctly.  Patient to contact his insurance company to see which supplier will need to go with him and we will order the appropriate supplies.     Bilateral carpal tunnel syndrome  This is a chronic condition.  Progressive.  Patient with bilateral wrist and hand tingling.  He states that it has progressed to the point that he now has hand tingling even when holding the phone.  He has tried wrist splinting in the past.  He has also taken oral prednisone previously.  Recommended continue stretching exercises, NSAIDs, ice application, wrist splinting, and rest as possible.  I will place a referral to sports medicine today.   - REFERRAL TO SPORTS MEDICINE    Plantar fasciitis, bilateral  This is a chronic condition.  Stable. Patient with bilateral heel pain.  This is most consistent with plantar fasciitis.  Recommended continued stretching exercises, orthotic shoe inserts, ice application  - REFERRAL TO SPORTS MEDICINE    Pain in joints  This is a chronic condition.  Stable.  The patient states his father has RA.  He states that he has tested negative for rheumatoid factor in the past.  Labs from 6/9/2021 showed a RF <10, and a normal CCP antibody of 12.  -Continue to monitor      I spent a total of 20 minutes with record review, exam, communication with the patient, communication with other providers, and documentation of this encounter.      Return in about 6 months (around 1/16/2022).    Please note that this dictation was created using  voice recognition software. I have made every reasonable attempt to correct obvious errors, but I expect that there are errors of grammar and possibly content that I did not discover before finalizing the note.